# Patient Record
Sex: MALE | Race: WHITE | NOT HISPANIC OR LATINO | URBAN - METROPOLITAN AREA
[De-identification: names, ages, dates, MRNs, and addresses within clinical notes are randomized per-mention and may not be internally consistent; named-entity substitution may affect disease eponyms.]

---

## 2022-06-29 ENCOUNTER — CLAIMS CREATED FROM THE CLAIM WINDOW (OUTPATIENT)
Dept: URBAN - METROPOLITAN AREA CLINIC 94 | Facility: CLINIC | Age: 42
End: 2022-06-29
Payer: SELF-PAY

## 2022-06-29 ENCOUNTER — WEB ENCOUNTER (OUTPATIENT)
Dept: URBAN - METROPOLITAN AREA CLINIC 94 | Facility: CLINIC | Age: 42
End: 2022-06-29

## 2022-06-29 VITALS
TEMPERATURE: 97.9 F | HEART RATE: 87 BPM | SYSTOLIC BLOOD PRESSURE: 117 MMHG | WEIGHT: 197 LBS | DIASTOLIC BLOOD PRESSURE: 93 MMHG | BODY MASS INDEX: 27.58 KG/M2 | HEIGHT: 71 IN

## 2022-06-29 DIAGNOSIS — R93.5 ABNORMAL CT OF THE ABDOMEN: ICD-10-CM

## 2022-06-29 DIAGNOSIS — R10.13 EPIGASTRIC PAIN: ICD-10-CM

## 2022-06-29 DIAGNOSIS — R19.7 DIARRHEA, UNSPECIFIED TYPE: ICD-10-CM

## 2022-06-29 DIAGNOSIS — R11.0 NAUSEA: ICD-10-CM

## 2022-06-29 PROCEDURE — 99204 OFFICE O/P NEW MOD 45 MIN: CPT | Performed by: INTERNAL MEDICINE

## 2022-06-29 RX ORDER — OMEPRAZOLE 40 MG/1
1 CAPSULE 30 MINUTES BEFORE MORNING MEAL CAPSULE, DELAYED RELEASE ORAL ONCE A DAY
Qty: 30 | Refills: 2 | OUTPATIENT
Start: 2022-06-29

## 2022-06-29 NOTE — HPI-TODAY'S VISIT:
42 y/o M with hx of CCY here for epigastric pain, nausea, diarrhea  Patient reports for the last several months has had epigastric-mid abdominal pain, along with nausea. He's had diarrhea since his GB removal but no new changes. Did go to [facility] ER in May for acute worsening of abdominal pain with normal labs other than mild leukocytosis, with CT a/p showing colitis, s/p abx.  Does admit to smoking daily marijuana since teenage years, denies family hx of IBD(cousin with UC)   CT a/p 05/2022: colitis from [facility]

## 2022-07-05 PROBLEM — 15634181000119107: Status: ACTIVE | Noted: 2022-07-05

## 2022-07-05 PROBLEM — 62315008: Status: ACTIVE | Noted: 2022-07-05

## 2022-07-28 ENCOUNTER — CLAIMS CREATED FROM THE CLAIM WINDOW (OUTPATIENT)
Dept: URBAN - METROPOLITAN AREA CLINIC 4 | Facility: CLINIC | Age: 42
End: 2022-07-28
Payer: SELF-PAY

## 2022-07-28 ENCOUNTER — CLAIMS CREATED FROM THE CLAIM WINDOW (OUTPATIENT)
Dept: URBAN - METROPOLITAN AREA SURGERY CENTER 17 | Facility: SURGERY CENTER | Age: 42
End: 2022-07-28
Payer: SELF-PAY

## 2022-07-28 DIAGNOSIS — K29.70 GASTRITIS, UNSPECIFIED, WITHOUT BLEEDING: ICD-10-CM

## 2022-07-28 DIAGNOSIS — K63.89 BACTERIAL OVERGROWTH SYNDROME: ICD-10-CM

## 2022-07-28 DIAGNOSIS — K63.89 OTHER SPECIFIED DISEASES OF INTESTINE: ICD-10-CM

## 2022-07-28 DIAGNOSIS — K31.89 ACQUIRED DEFORMITY OF DUODENUM: ICD-10-CM

## 2022-07-28 DIAGNOSIS — R93.3 ABN FINDINGS-GI TRACT: ICD-10-CM

## 2022-07-28 DIAGNOSIS — R11.0 AM NAUSEA: ICD-10-CM

## 2022-07-28 DIAGNOSIS — R19.7 ACUTE DIARRHEA: ICD-10-CM

## 2022-07-28 DIAGNOSIS — R10.13 ABDOMINAL DISCOMFORT, EPIGASTRIC: ICD-10-CM

## 2022-07-28 PROCEDURE — 88305 TISSUE EXAM BY PATHOLOGIST: CPT | Performed by: PATHOLOGY

## 2022-07-28 PROCEDURE — 43239 EGD BIOPSY SINGLE/MULTIPLE: CPT | Performed by: INTERNAL MEDICINE

## 2022-07-28 PROCEDURE — G8907 PT DOC NO EVENTS ON DISCHARG: HCPCS | Performed by: INTERNAL MEDICINE

## 2022-07-28 PROCEDURE — 45380 COLONOSCOPY AND BIOPSY: CPT | Performed by: INTERNAL MEDICINE

## 2022-07-28 PROCEDURE — 88313 SPECIAL STAINS GROUP 2: CPT | Performed by: PATHOLOGY

## 2022-07-28 PROCEDURE — 88342 IMHCHEM/IMCYTCHM 1ST ANTB: CPT | Performed by: PATHOLOGY

## 2022-07-28 PROCEDURE — 88312 SPECIAL STAINS GROUP 1: CPT | Performed by: PATHOLOGY

## 2022-07-28 RX ORDER — OMEPRAZOLE 40 MG/1
1 CAPSULE 30 MINUTES BEFORE MORNING MEAL CAPSULE, DELAYED RELEASE ORAL ONCE A DAY
Qty: 30 | Refills: 2 | Status: ACTIVE | COMMUNITY
Start: 2022-06-29

## 2022-08-31 ENCOUNTER — OFFICE VISIT (OUTPATIENT)
Dept: URBAN - METROPOLITAN AREA CLINIC 94 | Facility: CLINIC | Age: 42
End: 2022-08-31

## 2022-08-31 RX ORDER — OMEPRAZOLE 40 MG/1
1 CAPSULE 30 MINUTES BEFORE MORNING MEAL CAPSULE, DELAYED RELEASE ORAL ONCE A DAY
Qty: 30 | Refills: 2 | COMMUNITY
Start: 2022-06-29

## 2022-09-28 ENCOUNTER — OFFICE VISIT (OUTPATIENT)
Dept: URBAN - METROPOLITAN AREA CLINIC 94 | Facility: CLINIC | Age: 42
End: 2022-09-28
Payer: SELF-PAY

## 2022-09-28 VITALS
BODY MASS INDEX: 19.32 KG/M2 | HEART RATE: 58 BPM | HEIGHT: 71 IN | TEMPERATURE: 98.1 F | WEIGHT: 138 LBS | SYSTOLIC BLOOD PRESSURE: 106 MMHG | DIASTOLIC BLOOD PRESSURE: 75 MMHG

## 2022-09-28 DIAGNOSIS — K21.9 GERD WITHOUT ESOPHAGITIS: ICD-10-CM

## 2022-09-28 DIAGNOSIS — R11.0 NAUSEA: ICD-10-CM

## 2022-09-28 DIAGNOSIS — R10.13 EPIGASTRIC PAIN: ICD-10-CM

## 2022-09-28 PROBLEM — 422587007: Status: ACTIVE | Noted: 2022-07-05

## 2022-09-28 PROBLEM — 266435005: Status: ACTIVE | Noted: 2022-09-28

## 2022-09-28 PROBLEM — 79922009: Status: ACTIVE | Noted: 2022-07-05

## 2022-09-28 PROCEDURE — 99213 OFFICE O/P EST LOW 20 MIN: CPT | Performed by: INTERNAL MEDICINE

## 2022-09-28 RX ORDER — FAMOTIDINE 40 MG/1
1 TABLET AT BEDTIME AS NEEDED TABLET, FILM COATED ORAL ONCE A DAY
Qty: 90 | Refills: 2 | OUTPATIENT
Start: 2022-09-28

## 2022-09-28 RX ORDER — OMEPRAZOLE 40 MG/1
1 CAPSULE 30 MINUTES BEFORE MORNING MEAL CAPSULE, DELAYED RELEASE ORAL ONCE A DAY
Qty: 30 | Refills: 2 | Status: ACTIVE | COMMUNITY
Start: 2022-06-29

## 2022-09-28 RX ORDER — OMEPRAZOLE 40 MG/1
1 CAPSULE 30 MINUTES BEFORE MORNING MEAL CAPSULE, DELAYED RELEASE ORAL ONCE A DAY
OUTPATIENT
Start: 2022-06-29

## 2022-09-28 NOTE — HPI-TODAY'S VISIT:
41 y/o M with hx of CCY here for f/u for epigastric pain, nausea, diarrhea, now s/p EGD/CLS  Patient seen for epigastric-mid abdominal pain, along with nausea. Also had intermittent diarrhea since his GB removal but no new changes. Did go to [facility] ER in May for acute worsening of abdominal pain with normal labs other than mild leukocytosis, with CT a/p showing colitis, s/p abx. Now s/p EGD/CLS with just gastritis  Today reports resolution of all symptoms since being on omeprazole   Does admit to smoking daily marijuana since teenage years, denies family hx of IBD(cousin with UC)   CT a/p 05/2022: colitis from [facility] EGD 07/2022: Diffuse moderate gastric erythema. Biopsoes gastritis, no H.pylori CLS 07/2022: localized distal TI erythema, rest normal. Biopsies of TI, colon all normal

## 2023-03-21 ENCOUNTER — OFFICE VISIT (OUTPATIENT)
Dept: URBAN - METROPOLITAN AREA CLINIC 94 | Facility: CLINIC | Age: 43
End: 2023-03-21
Payer: SELF-PAY

## 2023-03-21 VITALS
WEIGHT: 140 LBS | HEART RATE: 71 BPM | BODY MASS INDEX: 19.6 KG/M2 | HEIGHT: 71 IN | DIASTOLIC BLOOD PRESSURE: 90 MMHG | TEMPERATURE: 97.9 F | SYSTOLIC BLOOD PRESSURE: 114 MMHG

## 2023-03-21 DIAGNOSIS — K21.9 GERD WITHOUT ESOPHAGITIS: ICD-10-CM

## 2023-03-21 DIAGNOSIS — R10.13 EPIGASTRIC PAIN: ICD-10-CM

## 2023-03-21 DIAGNOSIS — R11.0 NAUSEA: ICD-10-CM

## 2023-03-21 PROCEDURE — 99213 OFFICE O/P EST LOW 20 MIN: CPT | Performed by: INTERNAL MEDICINE

## 2023-03-21 RX ORDER — OMEPRAZOLE 40 MG/1
1 CAPSULE 30 MINUTES BEFORE MORNING MEAL CAPSULE, DELAYED RELEASE ORAL ONCE A DAY
Status: ACTIVE | COMMUNITY

## 2023-03-21 RX ORDER — SUCRALFATE 1 G
1 TABLET ON AN EMPTY STOMACH. CAN DISSOLVE IN WATER TABLET ORAL
Qty: 60 TABLET | Refills: 2 | OUTPATIENT
Start: 2023-03-21 | End: 2023-06-19

## 2023-03-21 RX ORDER — FAMOTIDINE 40 MG/1
1 TABLET AT BEDTIME AS NEEDED TABLET, FILM COATED ORAL ONCE A DAY
Qty: 90 | Refills: 2 | Status: ON HOLD | COMMUNITY
Start: 2022-09-28

## 2023-03-21 NOTE — HPI-TODAY'S VISIT:
43 y/o M with hx of CCY here for f/u for epigastric pain, nausea, diarrhea  Patient seen for epigastric-mid abdominal pain, along with nausea. Also had intermittent diarrhea since his GB removal but thats controlled. Did go to [facility] ER in May 2022 for acute worsening of abdominal pain with normal labs other than mild leukocytosis, with CT a/p showing colitis, s/p abx. Now s/p EGD/CLS with just gastritis. Symptoms had resolved on omeprazole, but now returned while on omeprazole. Switching to pepcid did not help   Does admit to smoking daily marijuana since teenage years, denies family hx of IBD(cousin with UC)   CT a/p 05/2022: colitis from cec[facility]-DC EGD 07/2022: Diffuse moderate gastric erythema. Biopsies gastritis, no H.pylori CLS 07/2022: localized distal TI erythema, rest normal. Biopsies of TI, colon all normal

## 2023-05-11 ENCOUNTER — P2P PATIENT RECORD (OUTPATIENT)
Age: 43
End: 2023-05-11

## 2023-05-30 ENCOUNTER — OFFICE VISIT (OUTPATIENT)
Dept: URBAN - METROPOLITAN AREA CLINIC 94 | Facility: CLINIC | Age: 43
End: 2023-05-30

## 2023-06-21 ENCOUNTER — OFFICE VISIT (OUTPATIENT)
Dept: URBAN - METROPOLITAN AREA CLINIC 94 | Facility: CLINIC | Age: 43
End: 2023-06-21

## 2023-08-09 ENCOUNTER — OFFICE VISIT (OUTPATIENT)
Dept: URBAN - METROPOLITAN AREA CLINIC 94 | Facility: CLINIC | Age: 43
End: 2023-08-09
Payer: SELF-PAY

## 2023-08-09 ENCOUNTER — WEB ENCOUNTER (OUTPATIENT)
Dept: URBAN - METROPOLITAN AREA CLINIC 94 | Facility: CLINIC | Age: 43
End: 2023-08-09

## 2023-08-09 VITALS
SYSTOLIC BLOOD PRESSURE: 105 MMHG | HEIGHT: 71 IN | WEIGHT: 135 LBS | TEMPERATURE: 98.1 F | HEART RATE: 62 BPM | BODY MASS INDEX: 18.9 KG/M2 | DIASTOLIC BLOOD PRESSURE: 68 MMHG

## 2023-08-09 DIAGNOSIS — K21.9 GERD WITHOUT ESOPHAGITIS: ICD-10-CM

## 2023-08-09 DIAGNOSIS — R11.0 NAUSEA: ICD-10-CM

## 2023-08-09 DIAGNOSIS — F12.188 CANNABIS HYPEREMESIS SYNDROME CONCURRENT WITH AND DUE TO CANNABIS ABUSE: ICD-10-CM

## 2023-08-09 DIAGNOSIS — R10.13 EPIGASTRIC PAIN: ICD-10-CM

## 2023-08-09 PROBLEM — 11047881000119101: Status: ACTIVE | Noted: 2023-08-09

## 2023-08-09 PROCEDURE — 99213 OFFICE O/P EST LOW 20 MIN: CPT | Performed by: INTERNAL MEDICINE

## 2023-08-09 RX ORDER — FAMOTIDINE 40 MG/1
1 TABLET AT BEDTIME AS NEEDED TABLET, FILM COATED ORAL ONCE A DAY
Qty: 90 | Refills: 2 | Status: ACTIVE | COMMUNITY
Start: 2022-09-28

## 2023-08-09 RX ORDER — CALCIUM CARBONATE 600 MG
1 TABLET TABLET,CHEWABLE ORAL ONCE A DAY
Status: ACTIVE | COMMUNITY

## 2023-08-09 RX ORDER — OMEPRAZOLE 40 MG/1
1 CAPSULE 30 MINUTES BEFORE MORNING MEAL CAPSULE, DELAYED RELEASE ORAL ONCE A DAY
Status: ACTIVE | COMMUNITY

## 2023-08-09 NOTE — HPI-TODAY'S VISIT:
44 y/o M with hx of CCY here for f/u for abdominal pain, nausea  Patient with fluctuating upper/mid abdominal pain, along with nausea, which is present this week. Previously intermittent diarrhea since his GB removal but thats controlled. He has had multiple ER visits for pain,nausea. May 2022 with CT a/p showing colitis, s/p abx. S/p EGD/CLS with just gastritis. Recent ER visits with year with last this June with normal labs, reassuring CT. Is on omeprazole, famotidine, gaviscon  Does admit to smoking daily marijuana since teenage years, denies family hx of IBD(cousin with UC)  CT a/p 06/2023: reflux esophagitis otherwise normal CT a/p 05/2022: colitis from [facility] EGD 07/2022: Diffuse moderate gastric erythema. Biopsies gastritis, no H.pylori CLS 07/2022: localized distal TI erythema, rest normal. Biopsies of TI, colon all normal

## 2023-09-15 ENCOUNTER — TELEPHONE ENCOUNTER (OUTPATIENT)
Dept: URBAN - METROPOLITAN AREA CLINIC 94 | Facility: CLINIC | Age: 43
End: 2023-09-15

## 2023-09-15 RX ORDER — OMEPRAZOLE 40 MG/1
1 CAPSULE 30 MINUTES BEFORE MORNING MEAL CAPSULE, DELAYED RELEASE ORAL ONCE A DAY
Qty: 30 | Refills: 5

## 2023-09-27 ENCOUNTER — OFFICE VISIT (OUTPATIENT)
Dept: URBAN - METROPOLITAN AREA CLINIC 94 | Facility: CLINIC | Age: 43
End: 2023-09-27

## 2024-02-09 ENCOUNTER — OV EP (OUTPATIENT)
Dept: URBAN - METROPOLITAN AREA CLINIC 94 | Facility: CLINIC | Age: 44
End: 2024-02-09
Payer: SELF-PAY

## 2024-02-09 VITALS
SYSTOLIC BLOOD PRESSURE: 103 MMHG | WEIGHT: 143 LBS | HEART RATE: 62 BPM | BODY MASS INDEX: 20.02 KG/M2 | DIASTOLIC BLOOD PRESSURE: 69 MMHG | TEMPERATURE: 98.6 F | HEIGHT: 71 IN

## 2024-02-09 DIAGNOSIS — R11.0 NAUSEA: ICD-10-CM

## 2024-02-09 DIAGNOSIS — F12.188 CANNABIS HYPEREMESIS SYNDROME CONCURRENT WITH AND DUE TO CANNABIS ABUSE: ICD-10-CM

## 2024-02-09 DIAGNOSIS — K21.9 GERD WITHOUT ESOPHAGITIS: ICD-10-CM

## 2024-02-09 DIAGNOSIS — R10.13 EPIGASTRIC PAIN: ICD-10-CM

## 2024-02-09 PROCEDURE — 99213 OFFICE O/P EST LOW 20 MIN: CPT | Performed by: INTERNAL MEDICINE

## 2024-02-09 RX ORDER — FAMOTIDINE 40 MG/1
1 TABLET AT BEDTIME AS NEEDED TABLET, FILM COATED ORAL ONCE A DAY
Qty: 90 | Refills: 2 | Status: ACTIVE | COMMUNITY
Start: 2022-09-28

## 2024-02-09 RX ORDER — OMEPRAZOLE 40 MG/1
1 CAPSULE 30 MINUTES BEFORE MORNING MEAL CAPSULE, DELAYED RELEASE ORAL ONCE A DAY
Qty: 30 | Refills: 5 | Status: ACTIVE | COMMUNITY

## 2024-02-09 RX ORDER — ALUMINUM HYDROXIDE AND MAGNESIUM CARBONATE 95; 358 MG/15ML; MG/15ML
15 ML AFTER MEALS AND AT BEDTIME AS NEEDED LIQUID ORAL
Status: ACTIVE | COMMUNITY

## 2024-02-09 NOTE — HPI-TODAY'S VISIT:
42 y/o M with hx of CCY here for f/u for abdominal pain, nausea.  Patient with improved upper/mid abdominal pain, along with nausea, both of which have improved significantly with reduction of marijuana use. Previously intermittent diarrhea since his GB removal but thats controlled. He has had multiple ER visits for pain,nausea. May 2022 with CT a/p showing colitis, s/p abx. S/p EGD/CLS with just gastritis. Recent ER visits with with last this January 2024 with normal labs, Ct suggestive of small bowel enteritis. Is on omeprazole, famotidine, gaviscon  Does admit to smoking daily marijuana since teenage years, reports cutting back but still daily use.  Denies family hx of IBD(cousin with UC).  CT a/p 01/2024: small bowel enteritis CT a/p 06/2023: reflux esophagitis otherwise normal CT a/p 05/2022: colitis from [facility] EGD 07/2022: Diffuse moderate gastric erythema. Biopsies gastritis, no H.pylori CLS 07/2022: localized distal TI erythema, rest normal. Biopsies of TI, colon all normal

## 2024-10-23 ENCOUNTER — ERX REFILL RESPONSE (OUTPATIENT)
Dept: URBAN - METROPOLITAN AREA CLINIC 94 | Facility: CLINIC | Age: 44
End: 2024-10-23

## 2024-10-23 RX ORDER — OMEPRAZOLE 40 MG/1
TAKE 1 CAPSULE BY MOUTH DAILY 30 MINUTES BEFORE MORNING MEAL CAPSULE, DELAYED RELEASE ORAL
Qty: 30 CAPSULE | Refills: 6 | OUTPATIENT

## 2024-10-23 RX ORDER — OMEPRAZOLE 40 MG/1
TAKE 1 CAPSULE BY MOUTH DAILY 30 MINUTES BEFORE MORNING MEAL CAPSULE, DELAYED RELEASE ORAL
Qty: 90 CAPSULE | Refills: 3 | OUTPATIENT

## 2025-02-12 ENCOUNTER — OFFICE VISIT (OUTPATIENT)
Dept: URBAN - METROPOLITAN AREA CLINIC 94 | Facility: CLINIC | Age: 45
End: 2025-02-12

## 2025-02-25 ENCOUNTER — DASHBOARD ENCOUNTERS (OUTPATIENT)
Age: 45
End: 2025-02-25

## 2025-02-25 ENCOUNTER — OFFICE VISIT (OUTPATIENT)
Dept: URBAN - METROPOLITAN AREA CLINIC 94 | Facility: CLINIC | Age: 45
End: 2025-02-25
Payer: SELF-PAY

## 2025-02-25 VITALS
TEMPERATURE: 98.5 F | DIASTOLIC BLOOD PRESSURE: 74 MMHG | HEIGHT: 71 IN | BODY MASS INDEX: 20.02 KG/M2 | SYSTOLIC BLOOD PRESSURE: 125 MMHG | HEART RATE: 88 BPM | WEIGHT: 143 LBS

## 2025-02-25 DIAGNOSIS — F12.188 CANNABIS HYPEREMESIS SYNDROME CONCURRENT WITH AND DUE TO CANNABIS ABUSE: ICD-10-CM

## 2025-02-25 DIAGNOSIS — R11.0 NAUSEA: ICD-10-CM

## 2025-02-25 DIAGNOSIS — R10.13 EPIGASTRIC PAIN: ICD-10-CM

## 2025-02-25 PROCEDURE — 99203 OFFICE O/P NEW LOW 30 MIN: CPT | Performed by: INTERNAL MEDICINE

## 2025-02-25 RX ORDER — OMEPRAZOLE 40 MG/1
TAKE 1 CAPSULE BY MOUTH DAILY 30 MINUTES BEFORE MORNING MEAL CAPSULE, DELAYED RELEASE ORAL
Qty: 90 CAPSULE | Refills: 3 | Status: ACTIVE | COMMUNITY

## 2025-02-25 RX ORDER — FAMOTIDINE 40 MG/1
1 TABLET AT BEDTIME AS NEEDED TABLET, FILM COATED ORAL ONCE A DAY
Qty: 90 | Refills: 2 | Status: ACTIVE | COMMUNITY
Start: 2022-09-28

## 2025-02-25 NOTE — HPI-TODAY'S VISIT:
----- Message from Aleksandr Chamberlain sent at 7/11/2019  8:13 AM CDT -----  Contact: [phone]/self  Patient would like to know if she will be seeing Dr Taveras today at the [facility] [place]   45 y/o M with hx of CCY here for f/u for abdominal pain, nausea, now doing well  Patient with upper/mid abdominal pain, along with nausea, both of which have improved significantly with reduction of marijuana use. Previously intermittent diarrhea since his GB removal but thats controlled. He has had multiple ER visits for pain,nausea. May 2022 with CT a/p showing colitis, s/p abx. S/p EGD/CLS with just gastritis. Most recent ER visit with with this Dec 2024 with normal labs and CT. Is on omeprazole, antacid  Does admit to smoking daily marijuana since teenage years, reports cutting back but still daily use of [employer].  Denies family hx of IBD(cousin with UC).  CT a/p 12/2024: No acute abnormality CT a/p 01/2024: small bowel enteritis CT a/p 06/2023: reflux esophagitis otherwise normal CT a/p 05/2022: colitis from [facility] EGD 07/2022: Diffuse moderate gastric erythema. Biopsies gastritis, no H.pylori CLS 07/2022: localized distal TI erythema, rest normal. Biopsies of TI, colon all normal